# Patient Record
Sex: FEMALE | Race: OTHER
[De-identification: names, ages, dates, MRNs, and addresses within clinical notes are randomized per-mention and may not be internally consistent; named-entity substitution may affect disease eponyms.]

---

## 2021-03-08 ENCOUNTER — HOSPITAL ENCOUNTER (INPATIENT)
Dept: HOSPITAL 11 - JP.SDS | Age: 26
LOS: 5 days | Discharge: HOME | DRG: 621 | End: 2021-03-13
Attending: SURGERY | Admitting: SURGERY
Payer: COMMERCIAL

## 2021-03-08 DIAGNOSIS — E66.01: Primary | ICD-10-CM

## 2021-03-08 DIAGNOSIS — F41.9: ICD-10-CM

## 2021-03-08 DIAGNOSIS — Z79.899: ICD-10-CM

## 2021-03-08 DIAGNOSIS — D17.1: ICD-10-CM

## 2021-03-08 DIAGNOSIS — J45.20: ICD-10-CM

## 2021-03-08 DIAGNOSIS — K44.9: ICD-10-CM

## 2021-03-08 DIAGNOSIS — R16.0: ICD-10-CM

## 2021-03-08 PROCEDURE — C9113 INJ PANTOPRAZOLE SODIUM, VIA: HCPCS

## 2021-03-11 PROCEDURE — 0BQT4ZZ REPAIR DIAPHRAGM, PERCUTANEOUS ENDOSCOPIC APPROACH: ICD-10-PCS | Performed by: SURGERY

## 2021-03-11 PROCEDURE — 0FB24ZX EXCISION OF LEFT LOBE LIVER, PERCUTANEOUS ENDOSCOPIC APPROACH, DIAGNOSTIC: ICD-10-PCS | Performed by: SURGERY

## 2021-03-11 PROCEDURE — 0JB63ZZ EXCISION OF CHEST SUBCUTANEOUS TISSUE AND FASCIA, PERCUTANEOUS APPROACH: ICD-10-PCS | Performed by: SURGERY

## 2021-03-11 PROCEDURE — 0D164ZA BYPASS STOMACH TO JEJUNUM, PERCUTANEOUS ENDOSCOPIC APPROACH: ICD-10-PCS | Performed by: SURGERY

## 2021-03-11 RX ADMIN — MAGNESIUM SULFATE IN WATER SCH MLS/HR: 40 INJECTION, SOLUTION INTRAVENOUS at 17:03

## 2021-03-11 RX ADMIN — SODIUM CHLORIDE SCH UNITS: 0.9 INJECTION, SOLUTION INTRAVENOUS at 17:02

## 2021-03-11 RX ADMIN — MAGNESIUM SULFATE IN WATER SCH MLS/HR: 40 INJECTION, SOLUTION INTRAVENOUS at 14:10

## 2021-03-12 RX ADMIN — SODIUM CHLORIDE SCH UNITS: 0.9 INJECTION, SOLUTION INTRAVENOUS at 17:00

## 2021-03-12 RX ADMIN — SODIUM CHLORIDE SCH UNITS: 0.9 INJECTION, SOLUTION INTRAVENOUS at 09:49

## 2021-03-12 RX ADMIN — Medication SCH: at 08:51

## 2021-03-12 RX ADMIN — SODIUM CHLORIDE SCH UNITS: 0.9 INJECTION, SOLUTION INTRAVENOUS at 01:07

## 2021-03-12 NOTE — PN
DATE OF SERVICE:  03/12/2021

 

SUBJECTIVE:  Ksenia is postoperative day 1 following a laparoscopic Liseth-en-Y gastric

bypass surgery.  Her upper GI this morning was normal.  Pain is controlled.  Oral intake

380, output 700.  RENE drain put out 40 mL of a light pink drainage.

 

ASSESSMENT:  Liseth-en-Y gastric bypass surgery.  Date of procedure of 03/11/2021.  Surgeon:

Amado Mejia MD.

 

PLAN:

1. Discontinue D5 LR.

2. Step 2 gastric bypass diet with no cereal.

3. Lactated Ringer at 100 mL per hour.

4. Discontinue Accu-Chek.

5. Discontinue IV Dilaudid.

6. Atarax 50 mg q.4 hours p.r.n. pain.

7. Communication order, 3 med cups per hour, record at bedside.

8. Discontinue continuous pulse ox and tele.

9. Continue use of incentive spirometer.

10.We will evaluate p.r.n. or in a.m.

 

 

 

 

Naima Mcdaniel PA-C

DD:  03/12/2021 08:04:53

DT:  03/12/2021 10:24:59

Job #:  [id number]

## 2021-03-13 RX ADMIN — SODIUM CHLORIDE SCH UNITS: 0.9 INJECTION, SOLUTION INTRAVENOUS at 09:49

## 2021-03-13 RX ADMIN — SODIUM CHLORIDE SCH UNITS: 0.9 INJECTION, SOLUTION INTRAVENOUS at 02:09

## 2021-03-13 RX ADMIN — Medication SCH: at 09:44

## 2021-03-15 NOTE — DISCH
FINAL DIAGNOSES:

1. Morbid obesity.

2. Hepatomegaly.

3. 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

 

SECONDARY DIAGNOSES:

1. Intermittent asthma.

2. Probable early diabetes or prediabetes.

3. History of anxiety.

4. Environmental allergies.

 

OPERATIVE PROCEDURE:  Done on the date of admission of 03/11, diagnostic laparoscopy with:

1. Laparoscopic Liseth-en-Y gastric bypass with long limb gastroenterostomy.

2. Ruiz-Cut needle liver biopsy.

3. Repair of paraesophageal diaphragmatic hernia.

4. Excision of mediastinal lipoma.

 

SUMMARY:  This is a 25-year-old female, presenting with longstanding morbid obesity and

increasingly significant comorbidities.  After preoperative evaluation and discussion, she

wished to proceed with a gastric bypass procedure.  This was done on the date of admission

along with the repair of the hiatal hernia and liver biopsy.  Postoperatively, she has done

very well.  At this point, she is not requiring anything, but Tylenol and Celebrex for her

discomfort, and will be discharged home on her albuterol, Lexapro, Zyrtec, Celebrex, and

ketoprofen eyedrops p.r.n.  We will hold the metformin at this point __________ more or less

as prophylaxis against diabetes, high blood sugars, and we will hold that at this point

postoperatively, and instructed if able to, to measure blood sugars roughly twice a day at

various times to see how __________ going in that regard.  Otherwise, she will be following

up with Naima Mcdaniel at [facility] on 03/22, and will be instructed to stay on a

step 2 diet until that appointment.

 

DD:  03/13/2021 09:51:09

DT:  03/15/2021 14:42:27

Job #:  [id number]

## 2021-03-23 NOTE — OR
DATE OF PROCEDURE:  03/11/2021

 

SURGEON:  Amado Mejia MD

 

PREOPERATIVE DIAGNOSIS:  Morbid obesity.

 

POSTOPERATIVE DIAGNOSES:

1. Morbid obesity.

2. Marked hepatomegaly.

3. Paraesophageal diaphragmatic hernia.

4. Mediastinal lipoma.

 

OPERATIVE PROCEDURES:

1. Diagnostic laparoscopy with:

    a.     Laparoscopic Liseth-en-Y gastric bypass with long limb gastroenterostomy (71465).

    b.     Ruiz-Cut needle liver biopsy (13060).

    c.     Repair of paraesophageal diaphragmatic hernia (58242).

    d.     Excision of mediastinal lipoma (23636).

 

ANESTHESIA:  General.

 

ASSISTANT:  Naima Mcdaniel PA-C.

 

INDICATIONS FOR PROCEDURE:  A 25-year-old presenting with longstanding morbid obesity and

increasingly significant comorbidities.  After preoperative evaluation and discussion, she

wished to proceed with a gastric bypass procedure.  Potential risks including bleeding,

infection, leaks from various GI tract closures, and problems with bowel obstruction over

time as well as the possibility of cardiopulmonary, septic, or hemorrhagic complications

leading to death were discussed, and the patient wishes to proceed.

 

DETAILS OF PROCEDURE:  The patient was taken to the operating room and placed in a supine

position.  After general endotracheal anesthesia was induced, she was converted to a

lithotomy position and the abdomen prepped and draped.

 

At 15 cm inferior and 5 cm left of the xiphoid process a transverse incision was made and

the peritoneal cavity entered under direct vision with an Optiview trocar and inflated to 15

mmHg pressure with CO2.  Bilateral transversus abdominis plane blocks were then placed and 5

additional trocars were placed across the upper and mid abdomen.  The patient was noted to

have marked hepatomegaly with liver being grossly fatty infiltrated and roughly 2 to 3 times

normal size.  Ruiz-Cut needle biopsies were obtained from left lobe of the liver.  Minimal

bleeding from biopsy sites was controlled with electrocautery.

 

The omentum was then divided in the midline up to the level of the transverse colon.  This

allowed identification of the small bowel to the ligament of Treitz.  Small bowel was then

traced out 200 cm distal to the ligament of Treitz, was divided transversely with a VEENA

stapler.  Small bowel was then traced out additional 200 cm where the side-to-side

enteroenterostomy was accomplished with internal firing of the Endo-VEENA 60 mm stapler.

Common wound was then closed transversely with the same stapler and angles anastomosed and

mesenteric defect approximated with some 0 Ethibond stitch along with fibrin sealant. The

divided end of the Liseth limb was then  from the mesentery for a few centimeters,

which allowed an antecolic positioning of the Liseth limb up to the level of the

gastroesophageal junction without tension.

 

The liver was then retracted anteriorly.  The patient was noted to have a moderate-sized

paraesophageal diaphragmatic hernia.  The peritoneum overlying was then divided and

reflected downward.  During the course of the dissection, a mediastinal lipoma was

encountered which was excised to facilitate more adequate crural repair, which was then

accomplished with 0 Ethibond sutures reinforced with PTFE pledgets.  These were placed in an

anterior location.

 

The gastrointestinal catheter was inflated to 15 mL and pulled up snugly against the EG

junction, gastric wall over the apex, balloon was then marked with electrocautery and the

catheter deflated and pulled up into the esophagus.  The lesser omental tissue adjacent to

the gastric cardia was then incised allowing dissection behind the stomach at that level.

Pouch formation was initiated with transverse firing of the VEENA stapler at the level of the

cauterized ricardo on the gastric cardia and then completed with additional firings of VEENA

staples up to and through the angle of His.  Upon completion of the pouch, both staple lines

were noted to be intact.

 

The anvil of a 25 mm EEA stapler was attached to Dawson sump-type tube and was brought down

through the mouth, taken out through a small opening in the gastric pouch, allowing the

anvil likewise to be placed into the gastric pouch.  The divided end of the Liseth limb was

then opened and the main body of the EEA stapler passed several centimeters into the lumen

of the small bowel, brought up the anvil, united with it thus creating the

gastrojejunostomy.  Upon removal of stapler, double donuts of mucosa were noted within it,

and the small bowel was closed off with a vascular staple line.  Gastrojejunostomy was then

reinforced with some 3-0 Vicryl seromuscular stitch along with fibrin sealant.  Leak test

was accomplished with injection of 120 mL of air in the gastric pouch while it was submerged

with cefoxitin-containing saline solution.  No leaks were identified.  A single Rashaad-

Guerrero drain was then placed adjacent to the gastrojejunostomy and from there up into the

splenic fossa.  The trocars were then sequentially removed and peritoneal cavity deflated.

Incisions were closed with 4-0 Vicryl skin stitch which was also used to fix the drain, and

the patient taken to the recovery room in satisfactory condition.

 

Physician assistant, Naima Mcdaniel, played an essential role in assisting in this case,

helping to position the patient, retract structures as needed, as well as suturing and

cutting sutures when indicated.  Her presence improved patient safety and decreased

operative time.

 

 

 

 

Amado Mejia MD

DD:  03/21/2021 20:51:13

DT:  03/23/2021 10:20:01

Job #:  [id number]